# Patient Record
(demographics unavailable — no encounter records)

---

## 2024-10-23 NOTE — HISTORY OF PRESENT ILLNESS
[de-identified] : Patient is here to follow up on right knee. Attending PT at [facility]. Notes continued buckling/clicking. Resting from soccer ([place]). Wearing playmaker.

## 2024-10-23 NOTE — HISTORY OF PRESENT ILLNESS
[de-identified] : Patient is here to follow up on right knee. Attending PT at [facility]. Notes continued buckling/clicking. Resting from soccer ([place]). Wearing playmaker.

## 2024-10-23 NOTE — RETURN TO WORK/SCHOOL
[FreeTextEntry1] :    Diagnosis: Right knee patellofemoral instability    X: Patient may return to school: 10/16/24   X: Patient cannot participate in gym/sports until follow up visit in 3 weeks   _ Patient may return to gym/sports:   _ Patient is on limited weight bearing:   _ Patient may return to full activities:   _ Patient requires early release from class/elevator pass for (or until):     SPECIAL INSTRUCTIONS:   _ Full duty, no restrictions.   _ Light duty, as follows:   _ Limited duty, as follows:   _ With Cast   _  With Brace   _ With Crutches   _ With Boot     Sincerely, Albaro Park MD Co- Chief Sports Medicine [facility] Medical Director [facility]  [facility] [employer]

## 2024-10-23 NOTE — DISCUSSION/SUMMARY
[Medication Risks Reviewed] : Medication risks reviewed [Surgical risks reviewed] : Surgical risks reviewed [de-identified] : Discussed continued treatment options for pt's PF instability   continue physical therapy to work on quad strengthening to help with patella stabilization  Continue use of playmaker at this time  Out of gym and sports until further notice.  Discussed obtaining functional brace in 3 weeks    Prescribed patient Motrin 600mgs and discussed risks of side effects and timing and management of medication. Side effects include but are not limited to gi ulcers and irritation, as well as kidney failure and bleeding issues.   f/u in 3 weeks    I, Lisa Wesley, attest that this documentation has been prepared under the direction and in the presence of Provider Dr. Albaro Park

## 2024-10-23 NOTE — DISCUSSION/SUMMARY
[Medication Risks Reviewed] : Medication risks reviewed [Surgical risks reviewed] : Surgical risks reviewed [de-identified] : Discussed continued treatment options for pt's PF instability   continue physical therapy to work on quad strengthening to help with patella stabilization  Continue use of playmaker at this time  Out of gym and sports until further notice.  Discussed obtaining functional brace in 3 weeks    Prescribed patient Motrin 600mgs and discussed risks of side effects and timing and management of medication. Side effects include but are not limited to gi ulcers and irritation, as well as kidney failure and bleeding issues.   f/u in 3 weeks    I, Lisa Wesley, attest that this documentation has been prepared under the direction and in the presence of Provider Dr. Albaro Park

## 2024-10-23 NOTE — PHYSICAL EXAM
[Right] : right knee [NL (0)] : extension 0 degrees [] : light touch is intact throughout [FreeTextEntry8] : Tender MPFL  [de-identified] : strength is improving  [TWNoteComboBox7] : flexion 120 degrees

## 2024-10-23 NOTE — PHYSICAL EXAM
[Right] : right knee [NL (0)] : extension 0 degrees [] : light touch is intact throughout [FreeTextEntry8] : Tender MPFL  [de-identified] : strength is improving  [TWNoteComboBox7] : flexion 120 degrees

## 2024-10-23 NOTE — RETURN TO WORK/SCHOOL
[FreeTextEntry1] :    Diagnosis: Right knee patellofemoral instability    X: Patient may return to school: 10/16/24   X: Patient cannot participate in gym/sports until follow up visit in 3 weeks   _ Patient may return to gym/sports:   _ Patient is on limited weight bearing:   _ Patient may return to full activities:   _ Patient requires early release from class/elevator pass for (or until):     SPECIAL INSTRUCTIONS:   _ Full duty, no restrictions.   _ Light duty, as follows:   _ Limited duty, as follows:   _ With Cast   _  With Brace   _ With Crutches   _ With Boot     Sincerely, Albaro Park MD Co- Chief Sports Medicine [facility] Medical Director [facility]  [facility] [employer]

## 2024-11-06 NOTE — RETURN TO WORK/SCHOOL
[FreeTextEntry1] :  Diagnosis: right knee patellofemoral syndrome    X: Patient may return to gym/sports Nov 05, 2024      X: Patient may return to full activities Nov 05, 2024    Sincerely, Albaro Park MD Co- Chief Sports Medicine [facility] Medical Director [facility] and [facility] , Orthopedic [facility]  [facility] [employer]

## 2024-11-06 NOTE — HISTORY OF PRESENT ILLNESS
[de-identified] : Follow up on the right knee patella dislocation today. Has been having some pain still since last visit. Attending PT but has some increased pain with PT

## 2024-11-06 NOTE — RETURN TO WORK/SCHOOL
[FreeTextEntry1] :  Diagnosis: right knee patellofemoral syndrome    X: Patient may return to gym/sports Nov 05, 2024      X: Patient may return to full activities Nov 05, 2024    Sincerely, Albaro Park MD Co- Chief Sports Medicine [facility] Medical Director [facility] and [facility] , Orthopedic [facility]  [facility] [employer]

## 2024-11-06 NOTE — DISCUSSION/SUMMARY
[de-identified] : The patients condition is acute.  Confounding medical conditions/concerns: None  Tests/Studies Independently Interpreted Today: Reviewed previous notes and images   The patient continues to improve on a gradual, interval basis reporting no recurrent symptoms or instability. Grossly benign physical examination upon office visit. We discussed treatment options both operative vs non-operative for patellofemoral syndrome. Advised that her discomfort was related to patella mal-tracking and muscular imbalances. We emphasized the long term importance of strengthening and stretching.   Continue physical therapy   The patient was advised to let pain guide the gradual advancement of activities. They have no activity restrictions at this time. Discussed the importance of increasing activity on an interval basis. If any discomfort is worsening, the patient will shut down.   Prescribed the patient Motrin 600mgs and discussed risks of side effects and timing and management of medication. Side effects include but are not limited to gi ulcers and irritation, as well as kidney failure and bleeding issues.   Follow up in 3 weeks if any discomfort persists

## 2024-11-06 NOTE — RETURN TO WORK/SCHOOL
[FreeTextEntry1] :  Diagnosis: right knee patellofemoral syndrome    X: Patient may return to gym/sports Nov 05, 2024      X: Patient may return to full activities Nov 05, 2024    Sincerely, Albaro Park MD Co- Chief Sports Medicine [facility] Medical Director [facility] and [facility] , Orthopedic [facility]  [facility] [employer]

## 2024-11-06 NOTE — DISCUSSION/SUMMARY
[de-identified] : The patients condition is acute.  Confounding medical conditions/concerns: None  Tests/Studies Independently Interpreted Today: Reviewed previous notes and images   The patient continues to improve on a gradual, interval basis reporting no recurrent symptoms or instability. Grossly benign physical examination upon office visit. We discussed treatment options both operative vs non-operative for patellofemoral syndrome. Advised that her discomfort was related to patella mal-tracking and muscular imbalances. We emphasized the long term importance of strengthening and stretching.   Continue physical therapy   The patient was advised to let pain guide the gradual advancement of activities. They have no activity restrictions at this time. Discussed the importance of increasing activity on an interval basis. If any discomfort is worsening, the patient will shut down.   Prescribed the patient Motrin 600mgs and discussed risks of side effects and timing and management of medication. Side effects include but are not limited to gi ulcers and irritation, as well as kidney failure and bleeding issues.   Follow up in 3 weeks if any discomfort persists

## 2024-11-06 NOTE — DISCUSSION/SUMMARY
[de-identified] : The patients condition is acute.  Confounding medical conditions/concerns: None  Tests/Studies Independently Interpreted Today: Reviewed previous notes and images   The patient continues to improve on a gradual, interval basis reporting no recurrent symptoms or instability. Grossly benign physical examination upon office visit. We discussed treatment options both operative vs non-operative for patellofemoral syndrome. Advised that her discomfort was related to patella mal-tracking and muscular imbalances. We emphasized the long term importance of strengthening and stretching.   Continue physical therapy   The patient was advised to let pain guide the gradual advancement of activities. They have no activity restrictions at this time. Discussed the importance of increasing activity on an interval basis. If any discomfort is worsening, the patient will shut down.   Prescribed the patient Motrin 600mgs and discussed risks of side effects and timing and management of medication. Side effects include but are not limited to gi ulcers and irritation, as well as kidney failure and bleeding issues.   Follow up in 3 weeks if any discomfort persists

## 2024-11-06 NOTE — PHYSICAL EXAM
[Right] : right knee [NL (140)] : flexion 140 degrees [NL (0)] : extension 0 degrees [5___] : quadriceps 5[unfilled]/5 [Negative] : negative anterior draw [] : light touch is intact throughout

## 2024-11-06 NOTE — HISTORY OF PRESENT ILLNESS
[de-identified] : Follow up on the right knee patella dislocation today. Has been having some pain still since last visit. Attending PT but has some increased pain with PT

## 2025-03-31 NOTE — HISTORY OF PRESENT ILLNESS
[de-identified] : New injury for left knee patellofemoral instability, walking around her room 3 weeks ago and patella dislocated - most recent subluxation yesterday. Has been treated by Dr. Park for right knee PF instability in 11/2024. Notes multiple instability events for right knee most recent today. Has been in her patella stabilizer braced. Treated right knee with physical therapy. Study at [employer], trying out for softball this week. No Motrin.

## 2025-03-31 NOTE — PHYSICAL EXAM
[Left] : left knee [4___] : quadriceps 4[unfilled]/5 [Right] : right knee [NL (140)] : flexion 140 degrees [NL (0)] : extension 0 degrees [5___] : quadriceps 5[unfilled]/5 [Negative] : negative anterior draw [Bilateral] : knee bilaterally [AP] : anteroposterior [Lateral] : lateral [[place]] : skyline [AP Standing] : anteroposterior standing [] : negative Pivot Shift [FreeTextEntry8] : Meidal patella tenderness, MCL tenderness  [FreeTextEntry9] : X-Ray bilateral knee is benign, lateral riding patella on left with femoral irregularity, otherwise no obvious bony abnormalities.

## 2025-03-31 NOTE — PHYSICAL EXAM
[Left] : left knee [4___] : quadriceps 4[unfilled]/5 [Right] : right knee [NL (140)] : flexion 140 degrees [NL (0)] : extension 0 degrees [5___] : quadriceps 5[unfilled]/5 [Negative] : negative anterior draw [Bilateral] : knee bilaterally [AP] : anteroposterior [Lateral] : lateral [[place]] : skyline [AP Standing] : anteroposterior standing [] : negative Pivot Shift [FreeTextEntry8] : Meidal patella tenderness, MCL tenderness  [FreeTextEntry9] : X-Ray bilateral knee is benign, lateral riding patella on left with femoral irregularity, otherwise no obvious bony abnormalities.

## 2025-03-31 NOTE — DISCUSSION/SUMMARY
[Medication Risks Reviewed] : Medication risks reviewed [de-identified] : The patients condition is acute.  Confounding medical conditions/concerns: History of bilateral patellofemoral instability  Tests/Studies Independently Interpreted Today: X-Ray bilateral knee is benign, lateral riding patella on left with femoral irregularity, otherwise no obvious bony abnormalities. Previous MRI right knee reveals evidence of lateral patella dislocation and instability.   The patient presents with recurrent bilateral knee patellofemoral instability, now occuring in both knees multiple times per week.   Due to worsening pain and instability with mechanical symptoms s/p patella dislocation, recommend the patient obtain MRI left knee to rule out patella dislocation and MPFL tear. Follow up after MRI to possibly rule out surgical pathology and discuss future treatment options.  meniscal tear vs ligament tear, discussed risks of potential meniscal surgery and risks of operative  and non operative treatment of cartilge injury, will obtain mri to see if surgery is necessary and guide future treatment, in interim discussed use of nsaids and side effects and recommended rehab and discussed risks and benefits of injection The patient will begin use of left knee patella stabilizer knee brace to ensure stability and avoid any recurrent instability/buckling events - fitted and dispensed in office today. She has a right knee patella stabilizer knee brace of which we recommended she get back into.   Get back into physical therapy   The patient and her mother are aware and understands that she is a candidate for a medial patellofemoral ligament reconstruction however, she decides to defer and continue on non-operative managements. If her patella dislocates and requires reduction, she will shut down and consider surgery   In the interim, she may participate in gym & sports in her braces  Prescribed the patient Motrin 600mgs and discussed risks of side effects and timing and management of medication. Side effects include but are not limited to gi ulcers and irritation, as well as kidney failure and bleeding issues.   Follow up in 2 weeks

## 2025-03-31 NOTE — HISTORY OF PRESENT ILLNESS
[de-identified] : New injury for left knee patellofemoral instability, walking around her room 3 weeks ago and patella dislocated - most recent subluxation yesterday. Has been treated by Dr. Park for right knee PF instability in 11/2024. Notes multiple instability events for right knee most recent today. Has been in her patella stabilizer braced. Treated right knee with physical therapy. Study at [employer], trying out for softball this week. No Motrin.

## 2025-03-31 NOTE — DISCUSSION/SUMMARY
[Medication Risks Reviewed] : Medication risks reviewed [de-identified] : The patients condition is acute.  Confounding medical conditions/concerns: History of bilateral patellofemoral instability  Tests/Studies Independently Interpreted Today: X-Ray bilateral knee is benign, lateral riding patella on left with femoral irregularity, otherwise no obvious bony abnormalities. Previous MRI right knee reveals evidence of lateral patella dislocation and instability.   The patient presents with recurrent bilateral knee patellofemoral instability, now occuring in both knees multiple times per week.   Due to worsening pain and instability with mechanical symptoms s/p patella dislocation, recommend the patient obtain MRI left knee to rule out patella dislocation and MPFL tear. Follow up after MRI to possibly rule out surgical pathology and discuss future treatment options.  meniscal tear vs ligament tear, discussed risks of potential meniscal surgery and risks of operative  and non operative treatment of cartilge injury, will obtain mri to see if surgery is necessary and guide future treatment, in interim discussed use of nsaids and side effects and recommended rehab and discussed risks and benefits of injection The patient will begin use of left knee patella stabilizer knee brace to ensure stability and avoid any recurrent instability/buckling events - fitted and dispensed in office today. She has a right knee patella stabilizer knee brace of which we recommended she get back into.   Get back into physical therapy   The patient and her mother are aware and understands that she is a candidate for a medial patellofemoral ligament reconstruction however, she decides to defer and continue on non-operative managements. If her patella dislocates and requires reduction, she will shut down and consider surgery   In the interim, she may participate in gym & sports in her braces  Prescribed the patient Motrin 600mgs and discussed risks of side effects and timing and management of medication. Side effects include but are not limited to gi ulcers and irritation, as well as kidney failure and bleeding issues.   Follow up in 2 weeks

## 2025-04-11 NOTE — PHYSICAL EXAM
SUBJECTIVE


Subjective


Felt somewhat weak this morning getting up to the bathroom. Hypoglycemia down 

to 60 last night.





OBJECTIVE


Objective


Reviewed.


Vital Signs





Vital Signs








  Date Time  Temp Pulse Resp B/P (MAP) Pulse Ox O2 Delivery O2 Flow Rate FiO2


 


8/12/18 08:16  78  138/51    


 


8/12/18 08:16  78  138/51    


 


8/12/18 08:15  78  138/51    


 


8/12/18 07:00 98.0 78 16 138/51 (80) 91 Room Air  





 98.0       


 


8/12/18 03:10 98.2 85 18 135/41 (72) 95 Room Air  





 98.2       


 


8/11/18 23:51 98.1 86 20 154/57 (89) 98 Room Air  





 98.1       


 


8/11/18 20:00      Room Air  


 


8/11/18 19:52 98.0 80 18 119/38 (65) 95 Room Air  





 98.0       


 


8/11/18 17:26  58  127/45    


 


8/11/18 15:10 97.9 58 17 127/45 (72) 97 Room Air  





 97.9       


 


8/11/18 10:57 97.7 70 18 103/41 (61) 94 Room Air  





 97.7       








I & O











Intake and Output 


 


 8/12/18





 07:00


 


Intake Total 1510 ml


 


Balance 1510 ml


 


 


 


Intake Oral 1510 ml


 


# Voids 6


 


# Bowel Movements 1











PHYSICAL EXAM


Physical Exam


Alert, oriented, smiling


RRR


CTAB


No edema in b/l LEs





ASSESSMENT/PLAN


Assessment/Plan


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 

imaging


Normocytic anemia, hx of Iron deficiency anemia


Congestive heart failure, not in acute exacerbation


Atrial fibrillation


Cardiomyopathy.


Diabetes


Coagulopathy, resolved with holding Coumadin.








Await biopsy results and further work up as indicated


Given hypoglycemia, decreased Lantus from 20U to 17U and Novolog from 5U to 4U. 

Monitor.





COMMENT


Lab





Laboratory Tests








Test


 8/11/18


11:20 8/11/18


17:25 8/11/18


20:26 8/11/18


21:14


 


Glucose (Fingerstick)


 146 mg/dL


(70-99) 72 mg/dL


(70-99) 60 mg/dL


(70-99) 82 mg/dL


(70-99)


 


Test


 8/12/18


02:10 8/12/18


04:30 8/12/18


07:01 





 


Glucose (Fingerstick)


 125 mg/dL


(70-99) 


 97 mg/dL


(70-99) 





 


White Blood Count


 


 7.3 x10^3/uL


(4.0-11.0) 


 





 


Red Blood Count


 


 3.00 x10^6/uL


(3.50-5.40) 


 





 


Hemoglobin


 


 8.2 g/dL


(12.0-15.5) 


 





 


Hematocrit


 


 25.3 %


(36.0-47.0) 


 





 


Mean Corpuscular Volume  84 fL ()   


 


Mean Corpuscular Hemoglobin  27 pg (25-35)   


 


Mean Corpuscular Hemoglobin


Concent 


 32 g/dL


(31-37) 


 





 


Red Cell Distribution Width


 


 20.7 %


(11.5-14.5) 


 





 


Platelet Count


 


 364 x10^3/uL


(140-400) 


 





 


Neutrophils (%) (Auto)  71 % (31-73)   


 


Lymphocytes (%) (Auto)  20 % (24-48)   


 


Monocytes (%) (Auto)  7 % (0-9)   


 


Eosinophils (%) (Auto)  2 % (0-3)   


 


Basophils (%) (Auto)  1 % (0-3)   


 


Neutrophils # (Auto)


 


 5.2 x10^3uL


(1.8-7.7) 


 





 


Lymphocytes # (Auto)


 


 1.4 x10^3/uL


(1.0-4.8) 


 





 


Monocytes # (Auto)


 


 0.5 x10^3/uL


(0.0-1.1) 


 





 


Eosinophils # (Auto)


 


 0.1 x10^3/uL


(0.0-0.7) 


 





 


Basophils # (Auto)


 


 0.1 x10^3/uL


(0.0-0.2) 


 





 


Sodium Level


 


 137 mmol/L


(136-145) 


 





 


Potassium Level


 


 3.7 mmol/L


(3.5-5.1) 


 





 


Chloride Level


 


 102 mmol/L


() 


 





 


Carbon Dioxide Level


 


 29 mmol/L


(21-32) 


 





 


Anion Gap  6 (6-14)   


 


Blood Urea Nitrogen


 


 13 mg/dL


(7-20) 


 





 


Creatinine


 


 0.8 mg/dL


(0.6-1.0) 


 





 


Estimated GFR


(Cockcroft-Gault) 


 68.7 


 


 





 


BUN/Creatinine Ratio  16 (6-20)   


 


Glucose Level


 


 100 mg/dL


(70-99) 


 





 


Calcium Level


 


 8.5 mg/dL


(8.5-10.1) 


 





 


Total Bilirubin


 


 0.5 mg/dL


(0.2-1.0) 


 





 


Aspartate Amino Transf


(AST/SGOT) 


 21 U/L (15-37) 


 


 





 


Alanine Aminotransferase


(ALT/SGPT) 


 17 U/L (14-59) 


 


 





 


Alkaline Phosphatase


 


 111 U/L


() 


 





 


Total Protein


 


 6.7 g/dL


(6.4-8.2) 


 





 


Albumin


 


 2.3 g/dL


(3.4-5.0) 


 





 


Albumin/Globulin Ratio  0.5 (1.0-1.7)   

















MARGUERITE JUAREZ MD Aug 12, 2018 08:56 [FreeTextEntry8] : Meidal patella tenderness, MCL tenderness

## 2025-04-11 NOTE — HISTORY OF PRESENT ILLNESS
[de-identified] : Follow up on the left knee patellar instability. Has been doing better and has gotten a lot of relief with the PT.

## 2025-04-11 NOTE — HISTORY OF PRESENT ILLNESS
[de-identified] : Follow up on the left knee patellar instability. Has been doing better and has gotten a lot of relief with the PT.

## 2025-04-11 NOTE — HISTORY OF PRESENT ILLNESS
Subjective:      Patient ID: Jose Delgado is a 22 y.o. male coming in for   Chief Complaint   Patient presents with    Gastroesophageal Reflux     6 months    Other     States that a few days ago he woke up with \"orange colored\" stain on his hands. It took a few days to go away and it has since gone away. He is not sure what caused this.        Well Adult Physical   Patient here for a comprehensive physical exam.The patient reports no problems. Pt reports two days ago he woke up with his palms of hands and under fingernails being orange in color. Reports no known cause of why. Symptoms gradually went away on there own.     Do you take any herbs or supplements that were not prescribed by a doctor? no Are you taking calcium supplements? no Are you taking aspirin daily? no   History:  N/A      Review of Systems   Skin:  Positive for color change.   Psychiatric/Behavioral:          Following with [facility] services for mental health.    All other systems reviewed and are negative.       Objective:/84   Pulse 86   Resp 18   Ht 1.651 m (5' 5\")   Wt 108.9 kg (240 lb)   SpO2 96%   BMI 39.94 kg/m²      Physical Exam  Vitals and nursing note reviewed.   Constitutional:       General: He is not in acute distress.     Appearance: Normal appearance. He is not ill-appearing.   HENT:      Head: Normocephalic.   Cardiovascular:      Rate and Rhythm: Normal rate and regular rhythm.      Heart sounds: Normal heart sounds.   Pulmonary:      Effort: Pulmonary effort is normal.      Breath sounds: Normal breath sounds.   Skin:     General: Skin is warm and dry.      Findings: No bruising, lesion or rash.   Neurological:      General: No focal deficit present.      Mental Status: He is alert and oriented to person, place, and time.          Assessment:      1. Encounter for adult wellness visit    2. Discoloration of skin           Plan:    -no discoloration on hands/palms today on exam  -will check cbc and cmp   -f/u in  [de-identified] : Follow up on the left knee patellar instability. Has been doing better and has gotten a lot of relief with the PT.

## 2025-04-11 NOTE — DISCUSSION/SUMMARY
[Surgical risks reviewed] : Surgical risks reviewed [de-identified] : The patients condition is acute.  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 Discussed their diagnosis and treatment options at length including the risks and benefits of both surgical and non-surgical options. Emphasized the importance of strengthening to further ensure proper tracking of patella.  Start physical therapy- work on quad strengthening   Prescribed the patient Motrin 600mgs and discussed risks of side effects and timing and management of medication. Side effects include but are not limited to gi ulcers and irritation, as well as kidney failure and bleeding issues.   Can participate in activity as pain permits  Follow up in  6 weeks   I, Lisa Wesley, attest that this documentation has been prepared under the direction and in the presence of Provider Dr. Albaro Park

## 2025-04-11 NOTE — DISCUSSION/SUMMARY
[Surgical risks reviewed] : Surgical risks reviewed [de-identified] : The patients condition is acute.  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 Discussed their diagnosis and treatment options at length including the risks and benefits of both surgical and non-surgical options. Emphasized the importance of strengthening to further ensure proper tracking of patella.  Start physical therapy- work on quad strengthening   Prescribed the patient Motrin 600mgs and discussed risks of side effects and timing and management of medication. Side effects include but are not limited to gi ulcers and irritation, as well as kidney failure and bleeding issues.   Can participate in activity as pain permits  Follow up in  6 weeks   I, Lisa Wesley, attest that this documentation has been prepared under the direction and in the presence of Provider Dr. Albaro Park

## 2025-04-11 NOTE — DISCUSSION/SUMMARY
[Surgical risks reviewed] : Surgical risks reviewed [de-identified] : The patients condition is acute.  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 Discussed their diagnosis and treatment options at length including the risks and benefits of both surgical and non-surgical options. Emphasized the importance of strengthening to further ensure proper tracking of patella.  Start physical therapy- work on quad strengthening   Prescribed the patient Motrin 600mgs and discussed risks of side effects and timing and management of medication. Side effects include but are not limited to gi ulcers and irritation, as well as kidney failure and bleeding issues.   Can participate in activity as pain permits  Follow up in  6 weeks   I, Lisa Wesley, attest that this documentation has been prepared under the direction and in the presence of Provider Dr. Albaro Park

## 2025-04-11 NOTE — DATA REVIEWED
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

## 2025-05-01 NOTE — HISTORY OF PRESENT ILLNESS
[FreeTextEntry1] :   Pediatric & Adolescent Gynecology: New Patient Visit   JAN is a(n) 14 year old female ( [place]io  3 2011) presenting for irregular and heavy menses   Referred by: none PCP: JESSE PIZARRO   Review of history from records:   Today 2025: pt is here with mother      Menstrual history:  Will have a few days of scant bleeding and then 2 weeks later will have a heavy menses   Legs will go numb "crushing feeling" - having to miss school  - will have no symptoms until has first bleed    LMP- 3/27 Prior periods:   Menarche- 12y (2023) Cycles (regular/irregular): irregular  - getting cycle every 2 weeks  Duration of periods (days): 5-10 days (usually 7 days) - 10-day menses was in 2024 into 2025 Flow: heavy with blood clots  - heavy for first 5 days  - last 2 days are lighter  Period products:  Cramps: severe and unrelenting  - will scream out in pain  - has stomachaches in general so unsure if cramping or not  - takes Naproxen and Midol extra strength  - 2024 did pass out from pain and ambulance had to be called  Symptoms: - lightheaded, dizziness, SOB, lethargic, nausea and vomiting (from the pain), headaches - acne but denies hirsutism    Family history: Mom denies having painful periods   MGM- had hysterectomy  Father and MGF- HTN  Mom has no dysmenorrhea, MGM had heavy painful menses. No formal endometriosis diagnosis.   Bleeding history: - No personal history of frequent/prolonged nosebleeds, easy bruising, excess bleeding with injury. *** Very frequent nosebleeds and will last for ~15 min  - Has never had surgery.  - No known family history of bleeding disorders.  Estrogen contraindications: - No personal history of DVT/PE/clotting disorder, migraines with aura; HTN, DM, dyslipidemia; cardiovascular, renal, liver, or inflammatory bowel disease; SLE with aPLA; malignancy. - No close family history of DVT/PE/clotting disorder.    Social / Confidential history: obtained 2025 School:  8th grade at [employer]  Activities/Hobbies: -  does theater, no sports or intense exertion. trying out for cheerleading this fall.  Lives with: parents - feels safe - no h/o abuse Mood: generally good - no h/o depression/anxiety

## 2025-05-01 NOTE — PLAN
[FreeTextEntry1] : Thank you for seeing us today!  Labs:  - Please have labs performed. These can be done at any time (you do not need to be fasting). - We will contact you with any results that need to be discussed before your next appointment. - [facility] lab locations: [facility]/labs   Imaging: - We have ordered a pelvic ultrasound - Please call [facility] Radiology to schedule: [phone]  TXA: - A prescription for Tranexamic acid (TXA) was sent to your pharmacy. Please take 1 pill THREE times per day (morning, afternoon and evening) for 5 days starting on the heaviest day of her period.  Pain medication: - For menstrual cramps: we recommend taking an NSAID (ibuprofen or naproxen). Start taking medication when the cramps are still mild, take it on a schedule for about 48 hours, then switch to taking only as needed. - Recommended dose: ibuprofen 400 mg every 4 hours or 600 mg every 6 hours, *or* naproxen 375 mg every 8 hours or 500-550 mg every 8 to 12 hours. - Make sure to take with food  Tracking: - Track menstrual periods & symptoms on a calendar or phone sergei (such as [employer], [employer]) - Please contact us if you have: -- very heavy bleeding (soaking a pad/tampon in 1-2 hours for 3-4 hours) -- bleeding (more than spotting) for >10 days -- severe pain with periods that is not relieved by pain medication -- (if not taking hormonal medication) periods are occurring too frequently (<21 days apart) or infrequently (>60 days apart)  Follow-up: - Please schedule your next appointment with Dr. Starks in about 1 months either in-person or via Telehealth visit. We recommend scheduling soon to ensure availability.  To contact the Pediatric & Adolescent Gynecology team: - phone: [phone] -- option 1 for appointments, option 2 for clinical questions - patient portal (available for ages <13 or 18+ through [employer] sergei/website) - email: [email or web address] (for non-urgent requests/records)     Appointment locations:  - Tuesdays: [address] [facility], [address], [facility] [address] ([facility])  - Fridays: [address], [place]

## 2025-05-01 NOTE — PLAN
[FreeTextEntry1] : Thank you for seeing us today!  Labs:  - Please have labs performed. These can be done at any time (you do not need to be fasting). - We will contact you with any results that need to be discussed before your next appointment. - [facility] lab locations: [facility]/labs   Imaging: - We have ordered a pelvic ultrasound - Please call [facility] Radiology to schedule: [phone]  TXA: - A prescription for Tranexamic acid (TXA) was sent to your pharmacy. Please take 1 pill THREE times per day (morning, afternoon and evening) for 5 days starting on the heaviest day of her period.  Pain medication: - For menstrual cramps: we recommend taking an NSAID (ibuprofen or naproxen). Start taking medication when the cramps are still mild, take it on a schedule for about 48 hours, then switch to taking only as needed. - Recommended dose: ibuprofen 400 mg every 4 hours or 600 mg every 6 hours, *or* naproxen 375 mg every 8 hours or 500-550 mg every 8 to 12 hours. - Make sure to take with food  Tracking: - Track menstrual periods & symptoms on a calendar or phone sergei (such as [employer]) - Please contact us if you have: -- very heavy bleeding (soaking a pad/tampon in 1-2 hours for 3-4 hours) -- bleeding (more than spotting) for >10 days -- severe pain with periods that is not relieved by pain medication -- (if not taking hormonal medication) periods are occurring too frequently (<21 days apart) or infrequently (>60 days apart)  Follow-up: - Please schedule your next appointment with Dr. Starks in about 1 months either in-person or via Telehealth visit. We recommend scheduling soon to ensure availability.  To contact the Pediatric & Adolescent Gynecology team: - phone: [phone] -- option 1 for appointments, option 2 for clinical questions - patient portal (available for ages <13 or 18+ through [employer] sergei/website) - email: [email or web address] (for non-urgent requests/records)     Appointment locations:  - Tuesdays: [address] [facility], [address], [facility] [address] ([facility])  - Fridays: [address] [place], [place]

## 2025-05-01 NOTE — PLAN
[FreeTextEntry1] : Thank you for seeing us today!  Labs:  - Please have labs performed. These can be done at any time (you do not need to be fasting). - We will contact you with any results that need to be discussed before your next appointment. - [facility] lab locations: [facility]/labs   Imaging: - We have ordered a pelvic ultrasound - Please call [facility] Radiology to schedule: [phone]  TXA: - A prescription for Tranexamic acid (TXA) was sent to your pharmacy. Please take 1 pill THREE times per day (morning, afternoon and evening) for 5 days starting on the heaviest day of her period.  Pain medication: - For menstrual cramps: we recommend taking an NSAID (ibuprofen or naproxen). Start taking medication when the cramps are still mild, take it on a schedule for about 48 hours, then switch to taking only as needed. - Recommended dose: ibuprofen 400 mg every 4 hours or 600 mg every 6 hours, *or* naproxen 375 mg every 8 hours or 500-550 mg every 8 to 12 hours. - Make sure to take with food  Tracking: - Track menstrual periods & symptoms on a calendar or phone sergei (such as [employer]) - Please contact us if you have: -- very heavy bleeding (soaking a pad/tampon in 1-2 hours for 3-4 hours) -- bleeding (more than spotting) for >10 days -- severe pain with periods that is not relieved by pain medication -- (if not taking hormonal medication) periods are occurring too frequently (<21 days apart) or infrequently (>60 days apart)  Follow-up: - Please schedule your next appointment with Dr. Starks in about 1 months either in-person or via Telehealth visit. We recommend scheduling soon to ensure availability.  To contact the Pediatric & Adolescent Gynecology team: - phone: [phone] -- option 1 for appointments, option 2 for clinical questions - patient portal (available for ages <13 or 18+ through [employer] sergei/website) - email: [email or web address] (for non-urgent requests/records)     Appointment locations:  - Tuesdays: [address] [facility], [address], [facility] [address] ([facility])  - Fridays: [address] [place], [place]

## 2025-05-01 NOTE — ASSESSMENT
[FreeTextEntry1] :       JAN is a 14 year F with history of irregular menses, characterized by frequent menses every 2 weeks and heavy. She presents for initial visit to Pediatric and Adolescent Gynecology.    - The patient's history of irregular menses was reviewed.  - I discussed with the patient and her family possible causes of irregular bleeding.  - Irregular menstruation can be due to hormonal causes or hematologic causes.  - Hormonal causes are more common.  Often heavy bleeding is the result of anovulatory cycles.  Lack of ovulation results in a dysfunctional bleeding pattern, which can be heavy and prolonged. It can also be the result of other endocrine pathology such as a thyroid disorder, hyperprolactinemia, or polycystic ovary syndrome.  - The patient and her family were counseled regarding irregular menstrual cycles. We discussed that cycle length in the adolescent can range from 21- 45 days in the first few years after menarche. The most common cause of menstrual irregularity is immaturity of the hypothalamic-pituitary-ovarian access in early adolescence. Generally irregular cycles will become more regular within 1-3 years after menarche.Other causes of irregular menstrual periods can include endocrine disorders such as thyroid dysfunction, hyperprolactinemia, polycystic ovary syndrome, or hypothalamic amenorrhea secondary to an eating disorder, female athlete triad, or chronic illness. - Based on the patient's clinical picture, I suspect *immature HPO axis vs. bleeding disorder (considering history of prolonged nose bleeds)* may be the most likely cause of irregular menstrual bleeding. - In the absence of pathology, conservative management with menstrual tracking only is adequate. However, regardless of cause, medication can be used to regulate menstrual cycles to improve satisfaction with periods. Treatment options for menstrual regulation were reviewed. The most common treatment is combined oral contraceptive pills. When taken appropriately, COCs can significantly decreased the number of days and amount of bleeding. Menses are generally shorter and lighter and occur regularly. Progesterone only pills (provera) medications used PRN when menses are missed can trigger a period, however they rely on routine use to prevent prolonged or heavy bleeding episodes. Other progesterone-only options such as depo-provera and the levonogesterol IUD can be used to alter menses leading to shorter and lighter periods, however, they do not regulate periods and instead have the goal of menstrual suppression and endometrial protection.  - We will first obtain the baseline labs to assess for the above noted etiologies and call the patient with results.   -  Additionally will obtain pelvic US given patient's history of dysmenorrhea. - Rx for TXA sent in case of heavy menses between now and next visit.   - RTO in 1 month     Total time spent: 60  minutes.   Total time documented was spent in face-to-face counseling of patient/family, review of records, discussion with team members, and clinical documentation.   Nicole Starks, DO Pediatric and Adolescent Gynecology

## 2025-05-01 NOTE — PHYSICAL EXAM
[TextEntry] :   Constitutional: well developed, well nourished, no acute distress Psychiatric: appropriate affect, makes eye contact Cardiovascular: regular rate and rhythm Respiratory: symmetric respiratory effort Gastrointestinal: soft, nontender, nondistended, no palpable masses, no hernias Skin: no significant acne, no hirsutism, no acanthosis nigricans

## 2025-05-01 NOTE — HISTORY OF PRESENT ILLNESS
[FreeTextEntry1] :   Pediatric & Adolescent Gynecology: New Patient Visit   JAN is a(n) 14 year old female ( [place]vid[place]  3 2011) presenting for irregular and heavy menses   Referred by: none PCP: JESSE PIZARRO   Review of history from records:   Today 2025: pt is here with mother      Menstrual history:  Will have a few days of scant bleeding and then 2 weeks later will have a heavy menses   Legs will go numb "crushing feeling" - having to miss school  - will have no symptoms until has first bleed    LMP- 3/27 Prior periods:   Menarche- 12y (2023) Cycles (regular/irregular): irregular  - getting cycle every 2 weeks  Duration of periods (days): 5-10 days (usually 7 days) - 10-day menses was in 2024 into 2025 Flow: heavy with blood clots  - heavy for first 5 days  - last 2 days are lighter  Period products:  Cramps: severe and unrelenting  - will scream out in pain  - has stomachaches in general so unsure if cramping or not  - takes Naproxen and Midol extra strength  - 2024 did pass out from pain and ambulance had to be called  Symptoms: - lightheaded, dizziness, SOB, lethargic, nausea and vomiting (from the pain), headaches - acne but denies hirsutism    Family history: Mom denies having painful periods   MGM- had hysterectomy  Father and MGF- HTN  Mom has no dysmenorrhea, MGM had heavy painful menses. No formal endometriosis diagnosis.   Bleeding history: - No personal history of frequent/prolonged nosebleeds, easy bruising, excess bleeding with injury. *** Very frequent nosebleeds and will last for ~15 min  - Has never had surgery.  - No known family history of bleeding disorders.  Estrogen contraindications: - No personal history of DVT/PE/clotting disorder, migraines with aura; HTN, DM, dyslipidemia; cardiovascular, renal, liver, or inflammatory bowel disease; SLE with aPLA; malignancy. - No close family history of DVT/PE/clotting disorder.    Social / Confidential history: obtained 2025 School:  8th grade at [employer]  Activities/Hobbies: -  does theater, no sports or intense exertion. trying out for cheerleading this fall.  Lives with: parents - feels safe - no h/o abuse Mood: generally good - no h/o depression/anxiety

## 2025-05-01 NOTE — HISTORY OF PRESENT ILLNESS
[FreeTextEntry1] :   Pediatric & Adolescent Gynecology: New Patient Visit   JAN is a(n) 14 year old female ( [place]vidio  3 2011) presenting for irregular and heavy menses   Referred by: none PCP: JESSE PIZARRO   Review of history from records:   Today 2025: pt is here with mother      Menstrual history:  Will have a few days of scant bleeding and then 2 weeks later will have a heavy menses   Legs will go numb "crushing feeling" - having to miss school  - will have no symptoms until has first bleed    LMP- 3/27 Prior periods:   Menarche- 12y (2023) Cycles (regular/irregular): irregular  - getting cycle every 2 weeks  Duration of periods (days): 5-10 days (usually 7 days) - 10-day menses was in 2024 into 2025 Flow: heavy with blood clots  - heavy for first 5 days  - last 2 days are lighter  Period products:  Cramps: severe and unrelenting  - will scream out in pain  - has stomachaches in general so unsure if cramping or not  - takes Naproxen and Midol extra strength  - 2024 did pass out from pain and ambulance had to be called  Symptoms: - lightheaded, dizziness, SOB, lethargic, nausea and vomiting (from the pain), headaches - acne but denies hirsutism    Family history: Mom denies having painful periods   MGM- had hysterectomy  Father and MGF- HTN  Mom has no dysmenorrhea, MGM had heavy painful menses. No formal endometriosis diagnosis.   Bleeding history: - No personal history of frequent/prolonged nosebleeds, easy bruising, excess bleeding with injury. *** Very frequent nosebleeds and will last for ~15 min  - Has never had surgery.  - No known family history of bleeding disorders.  Estrogen contraindications: - No personal history of DVT/PE/clotting disorder, migraines with aura; HTN, DM, dyslipidemia; cardiovascular, renal, liver, or inflammatory bowel disease; SLE with aPLA; malignancy. - No close family history of DVT/PE/clotting disorder.    Social / Confidential history: obtained 2025 School:  8th grade at [employer]  Activities/Hobbies: -  does theater, no sports or intense exertion. trying out for cheerleading this fall.  Lives with: parents - feels safe - no h/o abuse Mood: generally good - no h/o depression/anxiety

## 2025-05-30 NOTE — PLAN
[FreeTextEntry1] : Thank you for seeing us again today!   Please continue to track your periods, and take TXA as we discussed. You can take it starting on the heaviest day of your period, likely day 2. Take the TXA 3 times per day (morning, afternoon and night) for FIVE days. You can do this with each period. We will see you back in 3 months, and assess how this regimen is going.    Pain medication: - For menstrual cramps: we recommend taking an NSAID (ibuprofen or naproxen). Start taking medication when the cramps are still mild, take it on a schedule for about 48 hours, then switch to taking only as needed. - Recommended dose: ibuprofen 400 mg every 4 hours or 600 mg every 6 hours, *or* naproxen 375 mg every 8 hours or 500-550 mg every 8 to 12 hours. - Make sure to take with food   Tracking: - Track menstrual periods & symptoms on a calendar or phone sergei (such as [employer], [employer]) - Please contact us if you have: -- very heavy bleeding (soaking a pad/tampon in 1-2 hours for 3-4 hours) -- bleeding (more than spotting) for >10 days -- severe pain with periods that is not relieved by pain medication -- (if not taking hormonal medication) periods are occurring too frequently (<21 days apart) or infrequently (>60 days apart)   Follow-up: - Please schedule your next appointment with Dr. Starks in about 3 months with Telehealth/In-office visit. We recommend scheduling soon to ensure availability.   To contact the Pediatric & Adolescent Gynecology team: - phone: [phone] -- option 1 for appointments, option 2 for clinical questions - patient portal (available for ages <13 or 18+ through [employer] sergei/website) - email: [email or web address] (for non-urgent requests/records)   Appointment locations: - Tuesdays: [address] [facility], [address], [facility] [address] ([facility]) - Fridays: [address] [place], [place]

## 2025-05-30 NOTE — PLAN
[FreeTextEntry1] : Thank you for seeing us again today!   Please continue to track your periods, and take TXA as we discussed. You can take it starting on the heaviest day of your period, likely day 2. Take the TXA 3 times per day (morning, afternoon and night) for FIVE days. You can do this with each period. We will see you back in 3 months, and assess how this regimen is going.    Pain medication: - For menstrual cramps: we recommend taking an NSAID (ibuprofen or naproxen). Start taking medication when the cramps are still mild, take it on a schedule for about 48 hours, then switch to taking only as needed. - Recommended dose: ibuprofen 400 mg every 4 hours or 600 mg every 6 hours, *or* naproxen 375 mg every 8 hours or 500-550 mg every 8 to 12 hours. - Make sure to take with food   Tracking: - Track menstrual periods & symptoms on a calendar or phone sergei (such as [employer]) - Please contact us if you have: -- very heavy bleeding (soaking a pad/tampon in 1-2 hours for 3-4 hours) -- bleeding (more than spotting) for >10 days -- severe pain with periods that is not relieved by pain medication -- (if not taking hormonal medication) periods are occurring too frequently (<21 days apart) or infrequently (>60 days apart)   Follow-up: - Please schedule your next appointment with Dr. Starks in about 3 months with Telehealth/In-office visit. We recommend scheduling soon to ensure availability.   To contact the Pediatric & Adolescent Gynecology team: - phone: [phone] -- option 1 for appointments, option 2 for clinical questions - patient portal (available for ages <13 or 18+ through [employer] sergei/website) - email: [email or web address] (for non-urgent requests/records)   Appointment locations: - Tuesdays: [address] [facility], [address], [facility] [address] ([facility]) - Fridays: [address] [place], [place]

## 2025-05-30 NOTE — ASSESSMENT
[FreeTextEntry1] :       JAN is a 14 year F with history of irregular menses, characterized by frequent menses every 2 weeks and heavy. She presents for initial visit to Pediatric and Adolescent Gynecology.    - The patient's history of irregular menses was reviewed.  - I discussed with the patient and her family possible causes of irregular bleeding.  - Irregular menstruation can be due to hormonal causes or hematologic causes.  - Hormonal causes are more common.  Often heavy bleeding is the result of anovulatory cycles.  Lack of ovulation results in a dysfunctional bleeding pattern, which can be heavy and prolonged. It can also be the result of other endocrine pathology such as a thyroid disorder, hyperprolactinemia, or polycystic ovary syndrome.  - The patient and her family were counseled regarding irregular menstrual cycles. We discussed that cycle length in the adolescent can range from 21- 45 days in the first few years after menarche. The most common cause of menstrual irregularity is immaturity of the hypothalamic-pituitary-ovarian access in early adolescence. Generally irregular cycles will become more regular within 1-3 years after menarche.Other causes of irregular menstrual periods can include endocrine disorders such as thyroid dysfunction, hyperprolactinemia, polycystic ovary syndrome, or hypothalamic amenorrhea secondary to an eating disorder, female athlete triad, or chronic illness. - Based on the patient's clinical picture, I suspect *immature HPO axis vs. bleeding disorder (considering history of prolonged nose bleeds)* may be the most likely cause of irregular menstrual bleeding. - In the absence of pathology, conservative management with menstrual tracking only is adequate. However, regardless of cause, medication can be used to regulate menstrual cycles to improve satisfaction with periods. Treatment options for menstrual regulation were reviewed. The most common treatment is combined oral contraceptive pills. When taken appropriately, COCs can significantly decreased the number of days and amount of bleeding. Menses are generally shorter and lighter and occur regularly. Progesterone only pills (provera) medications used PRN when menses are missed can trigger a period, however they rely on routine use to prevent prolonged or heavy bleeding episodes. Other progesterone-only options such as depo-provera and the levonogesterol IUD can be used to alter menses leading to shorter and lighter periods, however, they do not regulate periods and instead have the goal of menstrual suppression and endometrial protection.  - We will first obtain the baseline labs to assess for the above noted etiologies and call the patient with results.   -  Additionally will obtain pelvic US given patient's history of dysmenorrhea. - Rx for TXA sent in case of heavy menses between now and next visit.   - RTO in 1 month     Total time spent: 40  minutes.   Total time documented was spent in face-to-face counseling of patient/family, review of records, discussion with team members, and clinical documentation.   Nicole Starks, DO Pediatric and Adolescent Gynecology

## 2025-05-30 NOTE — HISTORY OF PRESENT ILLNESS
[FreeTextEntry1] :   Pediatric & Adolescent Gynecology: Return Patient Visit   LORNA is a(n) 14 year old female ( Ovidio  3 2011) presenting for irregular and heavy menses   Referred by: none PCP: JESSE PIZARRO   Review of history from records:   Initial visit 2025: pt is here with mother      Menstrual history:  Will have a few days of scant bleeding and then 2 weeks later will have a heavy menses   Legs will go numb "crushing feeling" - having to miss school  - will have no symptoms until has first bleed    LMP- 3/27 Prior periods:   Menarche- 12y (2023) Cycles (regular/irregular): irregular  - getting cycle every 2 weeks  Duration of periods (days): 5-10 days (usually 7 days) - 10-day menses was in 2024 into 2025 Flow: heavy with blood clots  - heavy for first 5 days  - last 2 days are lighter  Period products:  Cramps: severe and unrelenting  - will scream out in pain  - has stomachaches in general so unsure if cramping or not  - takes Naproxen and Midol extra strength  - 2024 did pass out from pain and ambulance had to be called  Symptoms: - lightheaded, dizziness, SOB, lethargic, nausea and vomiting (from the pain), headaches - acne but denies hirsutism    Family history: Mom denies having painful periods   MGM- had hysterectomy  Father and MGF- HTN  Mom has no dysmenorrhea, MGM had heavy painful menses. No formal endometriosis diagnosis.   Bleeding history: - No personal history of frequent/prolonged nosebleeds, easy bruising, excess bleeding with injury. *** Very frequent nosebleeds and will last for ~15 min  - Has never had surgery.  - No known family history of bleeding disorders.  Estrogen contraindications: - No personal history of DVT/PE/clotting disorder, migraines with aura; HTN, DM, dyslipidemia; cardiovascular, renal, liver, or inflammatory bowel disease; SLE with aPLA; malignancy. - No close family history of DVT/PE/clotting disorder.    Social / Confidential history: obtained 2025 School:  8th grade at [employer]  Activities/Hobbies: -  does theater, no sports or intense exertion. trying out for cheerleading this fall.  Lives with: parents - feels safe - no h/o abuse Mood: generally good - no h/o depression/anxiety   TODAY 2025: pt is here with mother  LMP - 5/10 - period was not as bad as usual - still had crushing feeling in legs  - was having to change pad every 3 hrs while in [place] (day 1 and 2) - was feeling a little lightheaded and dizzy   TXA used for her last period - used it for 3 days (was away in [place] and was not expecting her period)  Pelvic sonogram done at [facility] in [place]  - done last week ()  Mom wants to try the TXA for a few more periods, since this was the first time and Lorna was traveling, so did not get ot take it in it's full course.

## 2025-05-30 NOTE — HISTORY OF PRESENT ILLNESS
[FreeTextEntry1] :   Pediatric & Adolescent Gynecology: Return Patient Visit   LORNA is a(n) 14 year old female ( [place]  3 2011) presenting for irregular and heavy menses   Referred by: none PCP: JESSE PIZARRO   Review of history from records:   Initial visit 2025: pt is here with mother      Menstrual history:  Will have a few days of scant bleeding and then 2 weeks later will have a heavy menses   Legs will go numb "crushing feeling" - having to miss school  - will have no symptoms until has first bleed    LMP- 3/27 Prior periods:   Menarche- 12y (2023) Cycles (regular/irregular): irregular  - getting cycle every 2 weeks  Duration of periods (days): 5-10 days (usually 7 days) - 10-day menses was in 2024 into 2025 Flow: heavy with blood clots  - heavy for first 5 days  - last 2 days are lighter  Period products:  Cramps: severe and unrelenting  - will scream out in pain  - has stomachaches in general so unsure if cramping or not  - takes Naproxen and Midol extra strength  - 2024 did pass out from pain and ambulance had to be called  Symptoms: - lightheaded, dizziness, SOB, lethargic, nausea and vomiting (from the pain), headaches - acne but denies hirsutism    Family history: Mom denies having painful periods   MGM- had hysterectomy  Father and MGF- HTN  Mom has no dysmenorrhea, MGM had heavy painful menses. No formal endometriosis diagnosis.   Bleeding history: - No personal history of frequent/prolonged nosebleeds, easy bruising, excess bleeding with injury. *** Very frequent nosebleeds and will last for ~15 min  - Has never had surgery.  - No known family history of bleeding disorders.  Estrogen contraindications: - No personal history of DVT/PE/clotting disorder, migraines with aura; HTN, DM, dyslipidemia; cardiovascular, renal, liver, or inflammatory bowel disease; SLE with aPLA; malignancy. - No close family history of DVT/PE/clotting disorder.    Social / Confidential history: obtained 2025 School:  8th grade at [employer]  Activities/Hobbies: -  does theater, no sports or intense exertion. trying out for cheerleading this fall.  Lives with: parents - feels safe - no h/o abuse Mood: generally good - no h/o depression/anxiety   TODAY 2025: pt is here with mother  LMP - 5/10 - period was not as bad as usual - still had crushing feeling in legs  - was having to change pad every 3 hrs while in [place] (day 1 and 2) - was feeling a little lightheaded and dizzy   TXA used for her last period - used it for 3 days (was away in [place] and was not expecting her period)  Pelvic sonogram done at [facility] in [place]  - done last week ()  Mom wants to try the TXA for a few more periods, since this was the first time and Lorna was traveling, so did not get ot take it in it's full course.